# Patient Record
Sex: MALE | Race: WHITE | NOT HISPANIC OR LATINO | Employment: STUDENT | URBAN - METROPOLITAN AREA
[De-identification: names, ages, dates, MRNs, and addresses within clinical notes are randomized per-mention and may not be internally consistent; named-entity substitution may affect disease eponyms.]

---

## 2022-06-14 ENCOUNTER — OFFICE VISIT (OUTPATIENT)
Dept: OTOLARYNGOLOGY | Facility: CLINIC | Age: 22
End: 2022-06-14
Payer: COMMERCIAL

## 2022-06-14 VITALS — WEIGHT: 158.5 LBS

## 2022-06-14 DIAGNOSIS — H61.21 HEARING LOSS DUE TO CERUMEN IMPACTION, RIGHT: ICD-10-CM

## 2022-06-14 DIAGNOSIS — H61.21 IMPACTED CERUMEN OF RIGHT EAR: Primary | ICD-10-CM

## 2022-06-14 PROCEDURE — 99999 PR PBB SHADOW E&M-NEW PATIENT-LVL II: ICD-10-PCS | Mod: PBBFAC,,, | Performed by: PHYSICIAN ASSISTANT

## 2022-06-14 PROCEDURE — 69210 REMOVE IMPACTED EAR WAX UNI: CPT | Mod: S$GLB,,, | Performed by: PHYSICIAN ASSISTANT

## 2022-06-14 PROCEDURE — 1160F RVW MEDS BY RX/DR IN RCRD: CPT | Mod: CPTII,S$GLB,, | Performed by: PHYSICIAN ASSISTANT

## 2022-06-14 PROCEDURE — 99999 PR PBB SHADOW E&M-NEW PATIENT-LVL II: CPT | Mod: PBBFAC,,, | Performed by: PHYSICIAN ASSISTANT

## 2022-06-14 PROCEDURE — 99203 OFFICE O/P NEW LOW 30 MIN: CPT | Mod: 25,S$GLB,, | Performed by: PHYSICIAN ASSISTANT

## 2022-06-14 PROCEDURE — 99203 PR OFFICE/OUTPT VISIT, NEW, LEVL III, 30-44 MIN: ICD-10-PCS | Mod: 25,S$GLB,, | Performed by: PHYSICIAN ASSISTANT

## 2022-06-14 PROCEDURE — 1160F PR REVIEW ALL MEDS BY PRESCRIBER/CLIN PHARMACIST DOCUMENTED: ICD-10-PCS | Mod: CPTII,S$GLB,, | Performed by: PHYSICIAN ASSISTANT

## 2022-06-14 PROCEDURE — 69210 PR REMOVAL IMPACTED CERUMEN REQUIRING INSTRUMENTATION, UNILATERAL: ICD-10-PCS | Mod: S$GLB,,, | Performed by: PHYSICIAN ASSISTANT

## 2022-06-14 PROCEDURE — 1159F MED LIST DOCD IN RCRD: CPT | Mod: CPTII,S$GLB,, | Performed by: PHYSICIAN ASSISTANT

## 2022-06-14 PROCEDURE — 1159F PR MEDICATION LIST DOCUMENTED IN MEDICAL RECORD: ICD-10-PCS | Mod: CPTII,S$GLB,, | Performed by: PHYSICIAN ASSISTANT

## 2022-06-14 NOTE — PROGRESS NOTES
Pediatric Otolaryngology- Head & Neck Surgery   New Patient Visit  Consult requested by:  Mari ABDULLAHI Mai, MD    Chief Complaint: Cerumen impaction    KSENIA Nolasco is a 22 y.o. old child referred to the pediatric otolaryngology clinic for a cerumen impaction. This has been present 3-4 wks.  Speech has been normal. Workup has included debrox at home. No history of trauma to the ear.  There has been no pruritis, otorrhea or otalgia. No obvious hearing loss. Not using any products to treat the cerumen build up.     he did pass the  hearing screening in both ear.     There is not a family history of hearing loss at an early age.     Medical History  No past medical history on file.    There is no problem list on file for this patient.      Surgical History  No past surgical history on file.    Medications  No current outpatient medications on file prior to visit.     No current facility-administered medications on file prior to visit.       Allergies  Review of patient's allergies indicates:  No Known Allergies    Social History  There no smokers in the home    Family History  The family history is noncontributory to the current problem     Review of Systems  General: no fever, no recent weight change  Eyes: no vision changes  Pulm: no asthma  Heme: no bleeding or anemia  GI: No GERD  Endo: No DM or thyroid problems  Musculoskeletal: no arthritis  Neuro: no seizures, speech or developmental delay  Skin: no rash  Psych: no psych history  Allergery/Immune: no allergy history or history of immunologic deficiency  Cardiac: no congenital cardiac abnormality    Physical Exam  General:  Alert, well developed, comfortable  Voice:  Regular for age, good volume  Respiratory:  Symmetric breathing, no stridor, no distress  Head:  Normocephalic, no lesions  Face: Symmetric, HB 1/6 bilat, no lesions, no obvious sinus tenderness, salivary glands nontender  Eyes:  Sclera white, extraocular movements intact  Nose: Dorsum  straight, septum midline, normal turbinate size, normal mucosa  Hearing:  Grossly intact  Oral cavity: Healthy mucosa, no masses or lesions including lips, teeth, gums, floor of mouth, palate, or tongue.  Oropharynx: Tonsils 1+, palate intact, normal pharyngeal wall movement  Neck: Supple, no palpable nodes, no masses, trachea midline, no thyroid masses  Cardiovascular system:  Pulses regular in both upper extremities, good skin turgor     Studies Reviewed  NA      Procedures  Microscopy:  Right Ear: Pinna and external ear appears normal, EAC occluded with cerumen, removed with binocular microscopy, TM intact, mobile, without middle ear effusion  Left Ear: Pinna and external ear appears normal, EAC clear, TM intact, mobile, without middle ear effusion      Impression  Cerumen impaction without otitis externa AD    Treatment Plan  Ear cleaning as needed